# Patient Record
(demographics unavailable — no encounter records)

---

## 2025-01-15 NOTE — PHYSICAL EXAM
[Chaperone Present] : A chaperone was present in the examining room during all aspects of the physical examination [[id number]] : A chaperone was present during the pelvic exam. [Labia Majora] : normal [Labia Minora] : normal [Normal] : normal [Uterine Adnexae] : normal [FreeTextEntry2] : caio

## 2025-01-15 NOTE — PROCEDURE
[Abnormal Uterine Bleeding] : abnormal uterine bleeding [Transvaginal Ultrasound] : transvaginal ultrasound [FreeTextEntry9] : post menopausal [FreeTextEntry3] : no free fluid cervix normal [FreeTextEntry5] : 47 cc vol, 2.4 mm [FreeTextEntry7] : 2.1 cc [FreeTextEntry8] : 1.7 cc

## 2025-01-21 NOTE — PROCEDURE
[Hysteroscopy] : Hysteroscopy [Time out performed] : Pre-procedure time out performed.  Patient's name, date of birth and procedure confirmed. [Consent Obtained] : Consent obtained [Postmenopausal bleeding] : postmenopausal bleeding [Risks] : risks [Benefits] : benefits [Alternatives] : alternatives [Patient] : patient [Infection] : infection [Bleeding] : bleeding [Allergic Reaction] : allergic reaction [rigid] : Using aseptic technique a hysteroscopy was performed using a rigid hysteroscope [Sent to Pathology] : specimen was placed in buffered formalin and sent for pathology [Hemostasis obtained] : hemostasis obtained [Tolerated Well] : Patient tolerated the procedure well [Aftercare instructions/regstrictions given and follow-up scheduled] : Aftercare instructions/restrictions given and follow-up scheduled [Antibiotics given] : antibiotics not given [de-identified] :    minimal atrophic tissue  os finder/junito dilators sounded to 5 cm endosampler...minimal tissue atrophy neither ostia visualized previous ablation?? will re-question patient uncomplicated

## 2025-02-11 NOTE — PROCEDURE
[Transvaginal Ultrasound] : transvaginal ultrasound [FreeTextEntry9] : post menopausal bleeding/synechiae [FreeTextEntry3] : no free fluid no cysts lining thin cervix normal [FreeTextEntry5] : 44 cc vol,  1 mm [FreeTextEntry7] : 6.6 cc [FreeTextEntry8] : 4.8 cc

## 2025-02-11 NOTE — PHYSICAL EXAM
[Chaperone Present] : A chaperone was present in the examining room during all aspects of the physical examination [[id number]] : A chaperone was present during the pelvic exam. [Labia Majora] : normal [Labia Minora] : normal [Normal] : normal [Uterine Adnexae] : normal

## 2025-02-11 NOTE — HISTORY OF PRESENT ILLNESS
[FreeTextEntry1] : discussed the probability in detail that she most likely had an endometrial ablation in the past, disallowing entry into her endometrial cavity to properly sample her lining, thus...no endometrial tissue  No more bleeding sono normal